# Patient Record
Sex: FEMALE | Race: WHITE | NOT HISPANIC OR LATINO | URBAN - METROPOLITAN AREA
[De-identification: names, ages, dates, MRNs, and addresses within clinical notes are randomized per-mention and may not be internally consistent; named-entity substitution may affect disease eponyms.]

---

## 2020-08-06 ENCOUNTER — OUTPATIENT (OUTPATIENT)
Dept: OUTPATIENT SERVICES | Facility: HOSPITAL | Age: 25
LOS: 1 days | End: 2020-08-06

## 2020-08-06 VITALS
HEART RATE: 61 BPM | DIASTOLIC BLOOD PRESSURE: 72 MMHG | RESPIRATION RATE: 16 BRPM | OXYGEN SATURATION: 98 % | HEIGHT: 63 IN | TEMPERATURE: 98 F | WEIGHT: 111.99 LBS | SYSTOLIC BLOOD PRESSURE: 98 MMHG

## 2020-08-06 DIAGNOSIS — Z98.890 OTHER SPECIFIED POSTPROCEDURAL STATES: Chronic | ICD-10-CM

## 2020-08-06 DIAGNOSIS — M26.619 ADHESIONS AND ANKYLOSIS OF TEMPOROMANDIBULAR JOINT, UNSPECIFIED SIDE: ICD-10-CM

## 2020-08-06 DIAGNOSIS — Z90.89 ACQUIRED ABSENCE OF OTHER ORGANS: Chronic | ICD-10-CM

## 2020-08-06 LAB
ANION GAP SERPL CALC-SCNC: 16 MMO/L — HIGH (ref 7–14)
BUN SERPL-MCNC: 15 MG/DL — SIGNIFICANT CHANGE UP (ref 7–23)
CALCIUM SERPL-MCNC: 9.5 MG/DL — SIGNIFICANT CHANGE UP (ref 8.4–10.5)
CHLORIDE SERPL-SCNC: 101 MMOL/L — SIGNIFICANT CHANGE UP (ref 98–107)
CO2 SERPL-SCNC: 22 MMOL/L — SIGNIFICANT CHANGE UP (ref 22–31)
CREAT SERPL-MCNC: 0.75 MG/DL — SIGNIFICANT CHANGE UP (ref 0.5–1.3)
GLUCOSE SERPL-MCNC: 91 MG/DL — SIGNIFICANT CHANGE UP (ref 70–99)
HCG UR-SCNC: NEGATIVE — SIGNIFICANT CHANGE UP
HCT VFR BLD CALC: 40.8 % — SIGNIFICANT CHANGE UP (ref 34.5–45)
HGB BLD-MCNC: 13.9 G/DL — SIGNIFICANT CHANGE UP (ref 11.5–15.5)
MCHC RBC-ENTMCNC: 29.6 PG — SIGNIFICANT CHANGE UP (ref 27–34)
MCHC RBC-ENTMCNC: 34.1 % — SIGNIFICANT CHANGE UP (ref 32–36)
MCV RBC AUTO: 87 FL — SIGNIFICANT CHANGE UP (ref 80–100)
NRBC # FLD: 0 K/UL — SIGNIFICANT CHANGE UP (ref 0–0)
PLATELET # BLD AUTO: 202 K/UL — SIGNIFICANT CHANGE UP (ref 150–400)
PMV BLD: 11.6 FL — SIGNIFICANT CHANGE UP (ref 7–13)
POTASSIUM SERPL-MCNC: 3.6 MMOL/L — SIGNIFICANT CHANGE UP (ref 3.5–5.3)
POTASSIUM SERPL-SCNC: 3.6 MMOL/L — SIGNIFICANT CHANGE UP (ref 3.5–5.3)
RBC # BLD: 4.69 M/UL — SIGNIFICANT CHANGE UP (ref 3.8–5.2)
RBC # FLD: 12.2 % — SIGNIFICANT CHANGE UP (ref 10.3–14.5)
SODIUM SERPL-SCNC: 139 MMOL/L — SIGNIFICANT CHANGE UP (ref 135–145)
SP GR UR: 1.02 — SIGNIFICANT CHANGE UP (ref 1–1.04)
WBC # BLD: 6.03 K/UL — SIGNIFICANT CHANGE UP (ref 3.8–10.5)
WBC # FLD AUTO: 6.03 K/UL — SIGNIFICANT CHANGE UP (ref 3.8–10.5)

## 2020-08-06 NOTE — H&P PST ADULT - NSANTHOSAYNRD_GEN_A_CORE
No. MT screening performed.  STOP BANG Legend: 0-2 = LOW Risk; 3-4 = INTERMEDIATE Risk; 5-8 = HIGH Risk

## 2020-08-06 NOTE — H&P PST ADULT - NEGATIVE ENMT SYMPTOMS
no dysphagia/no tinnitus/no vertigo/no sinus symptoms/no hearing difficulty/no ear pain/no throat pain

## 2020-08-06 NOTE — H&P PST ADULT - NSICDXFAMILYHX_GEN_ALL_CORE_FT
FAMILY HISTORY:  Mother  Still living? Unknown  FH: breast cancer, Age at diagnosis: Age Unknown    Grandparent  Still living? Unknown  FH: breast cancer, Age at diagnosis: Age Unknown    Aunt  Still living? Unknown  FH: breast cancer, Age at diagnosis: Age Unknown

## 2020-08-06 NOTE — H&P PST ADULT - NSICDXPROBLEM_GEN_ALL_CORE_FT
PROBLEM DIAGNOSES  Problem: Adhesions and ankylosis of temporomandibular joint  Assessment and Plan: Pt scheduled for surgery on 8/11/2020.  Pre-op instructions provided. Pt verbalized understanding.   Pepcid provided for GI prophylaxis. PROBLEM DIAGNOSES  Problem: Adhesions and ankylosis of temporomandibular joint  Assessment and Plan: Pt scheduled for surgery on 8/11/2020.  Pre-op instructions provided. Pt verbalized understanding.   Pepcid provided for GI prophylaxis.   Pt states she is already scheduled for preop COVID testing.

## 2020-08-06 NOTE — H&P PST ADULT - HISTORY OF PRESENT ILLNESS
25 year old female with c/o TMJ clicking and pain x past few years with worsening of symptoms over the past 2 months. Pt presents today for presurgical evaluation for ... 25 year old female with c/o TMJ clicking and pain x past few years with worsening of symptoms over the past 2 months. Pt presents today for presurgical evaluation for Bilateral Temporomandibular Joint Arthroscopy.

## 2020-08-06 NOTE — H&P PST ADULT - MALLAMPATI CLASS
pt unable to open mouth fully due to TMJ pain/Class III - visualization of the soft palate and the base of the uvula

## 2020-08-06 NOTE — H&P PST ADULT - DENTITION
Reached out to on call provider as this patient states it has been three days requesting a nicotine patch, and he stated he was willing to have a friend bring cigarettes and leave the floor AMA. 1608-Dr. Jacki Majano called to place phone order for 21 mg patch for the patient. Will place once verified by pharmacy. Pt denies any loose teeth or dentures. Permenant lower retainer

## 2020-08-07 DIAGNOSIS — Z01.818 ENCOUNTER FOR OTHER PREPROCEDURAL EXAMINATION: ICD-10-CM

## 2020-08-08 ENCOUNTER — APPOINTMENT (OUTPATIENT)
Dept: DISASTER EMERGENCY | Facility: CLINIC | Age: 25
End: 2020-08-08

## 2020-08-08 LAB — SARS-COV-2 N GENE NPH QL NAA+PROBE: NOT DETECTED

## 2020-08-10 ENCOUNTER — TRANSCRIPTION ENCOUNTER (OUTPATIENT)
Age: 25
End: 2020-08-10

## 2020-08-11 ENCOUNTER — OUTPATIENT (OUTPATIENT)
Dept: OUTPATIENT SERVICES | Facility: HOSPITAL | Age: 25
LOS: 1 days | Discharge: HOME | End: 2020-08-11

## 2020-08-11 VITALS
SYSTOLIC BLOOD PRESSURE: 113 MMHG | OXYGEN SATURATION: 100 % | HEART RATE: 68 BPM | WEIGHT: 111.99 LBS | DIASTOLIC BLOOD PRESSURE: 74 MMHG | TEMPERATURE: 99 F | RESPIRATION RATE: 16 BRPM | HEIGHT: 63 IN

## 2020-08-11 VITALS
SYSTOLIC BLOOD PRESSURE: 118 MMHG | TEMPERATURE: 99 F | OXYGEN SATURATION: 99 % | HEART RATE: 96 BPM | DIASTOLIC BLOOD PRESSURE: 73 MMHG | RESPIRATION RATE: 14 BRPM

## 2020-08-11 DIAGNOSIS — M26.619 ADHESIONS AND ANKYLOSIS OF TEMPOROMANDIBULAR JOINT, UNSPECIFIED SIDE: ICD-10-CM

## 2020-08-11 DIAGNOSIS — Z90.89 ACQUIRED ABSENCE OF OTHER ORGANS: Chronic | ICD-10-CM

## 2020-08-11 DIAGNOSIS — Z98.890 OTHER SPECIFIED POSTPROCEDURAL STATES: Chronic | ICD-10-CM

## 2020-08-11 LAB — HCG UR QL: NEGATIVE — SIGNIFICANT CHANGE UP

## 2020-08-11 RX ORDER — MESALAMINE 400 MG
4 TABLET, DELAYED RELEASE (ENTERIC COATED) ORAL
Qty: 0 | Refills: 0 | DISCHARGE

## 2020-08-11 RX ORDER — SERTRALINE 25 MG/1
1 TABLET, FILM COATED ORAL
Qty: 0 | Refills: 0 | DISCHARGE

## 2020-08-11 RX ORDER — FENTANYL CITRATE 50 UG/ML
40 INJECTION INTRAVENOUS
Refills: 0 | Status: DISCONTINUED | OUTPATIENT
Start: 2020-08-11 | End: 2020-08-11

## 2020-08-11 RX ORDER — DESOGESTREL AND ETHINYL ESTRADIOL 0.15-0.03
1 KIT ORAL
Qty: 0 | Refills: 0 | DISCHARGE

## 2020-08-11 RX ORDER — SERTRALINE 25 MG/1
50 TABLET, FILM COATED ORAL DAILY
Refills: 0 | Status: DISCONTINUED | OUTPATIENT
Start: 2020-08-11 | End: 2020-08-11

## 2020-08-11 RX ORDER — SODIUM CHLORIDE 9 MG/ML
3 INJECTION INTRAMUSCULAR; INTRAVENOUS; SUBCUTANEOUS EVERY 8 HOURS
Refills: 0 | Status: DISCONTINUED | OUTPATIENT
Start: 2020-08-11 | End: 2020-08-11

## 2020-08-11 RX ORDER — OXYCODONE HYDROCHLORIDE 5 MG/1
1 TABLET ORAL
Qty: 12 | Refills: 0
Start: 2020-08-11 | End: 2020-08-12

## 2020-08-11 RX ORDER — SODIUM CHLORIDE 9 MG/ML
1000 INJECTION, SOLUTION INTRAVENOUS
Refills: 0 | Status: DISCONTINUED | OUTPATIENT
Start: 2020-08-11 | End: 2020-08-11

## 2020-08-11 RX ORDER — FENTANYL CITRATE 50 UG/ML
20 INJECTION INTRAVENOUS
Refills: 0 | Status: DISCONTINUED | OUTPATIENT
Start: 2020-08-11 | End: 2020-08-11

## 2020-08-11 RX ORDER — MESALAMINE 400 MG
3 TABLET, DELAYED RELEASE (ENTERIC COATED) ORAL
Qty: 0 | Refills: 0 | DISCHARGE

## 2020-08-11 RX ADMIN — FENTANYL CITRATE 20 MICROGRAM(S): 50 INJECTION INTRAVENOUS at 10:35

## 2020-08-11 RX ADMIN — FENTANYL CITRATE 40 MICROGRAM(S): 50 INJECTION INTRAVENOUS at 09:25

## 2020-08-11 RX ADMIN — FENTANYL CITRATE 20 MICROGRAM(S): 50 INJECTION INTRAVENOUS at 10:50

## 2020-08-11 RX ADMIN — FENTANYL CITRATE 40 MICROGRAM(S): 50 INJECTION INTRAVENOUS at 10:00

## 2020-08-11 RX ADMIN — FENTANYL CITRATE 40 MICROGRAM(S): 50 INJECTION INTRAVENOUS at 09:40

## 2020-08-11 RX ADMIN — FENTANYL CITRATE 40 MICROGRAM(S): 50 INJECTION INTRAVENOUS at 10:30

## 2020-08-11 NOTE — ASU DISCHARGE PLAN (ADULT/PEDIATRIC) - ASU DC SPECIAL INSTRUCTIONSFT
You received iv tylenol at  900 am You must not take tylenol or any products containing tylenol until after 3 pm you received iv toradol you must not take any products containing ibuprofen until after 0300 pm today

## 2020-08-11 NOTE — H&P ADULT - NSHPSOCIALHISTORY_GEN_ALL_CORE
Social History:  · Marital Status	Single  · Occupation	 in social media  · Lives With	significant other     Substance Use History:  · Substance Use	caffeine  · Caffeine Type	coffee  · Caffeine Amount/Frequency	1-2 cups/cans per day     Alcohol Use History:  · Have you ever consumed alcohol	yes...  · Alcohol Type	wine  · Alcohol Frequency	2-4 times/mo  · Alcohol Amount	1-2 drinks  · Problems Related to Alcohol Use	no  · 1. Have you felt you ought to CUT down on your drinking?	no  · 2. Have people ANNOYED you by criticizing your drinking?	no  · 3. Have you ever felt bad or GUILTY about your drinking?	no  · 4. Have you ever needed an "EYE OPENER", a drink first thing in the morning to steady your nerves or get rid of a hangover?	no     Tobacco Usage:  · Tobacco Usage: Never smoker     Passive Smoke Exposure:  · Passive Smoke Exposure	No

## 2020-08-11 NOTE — H&P ADULT - NSHPREVIEWOFSYSTEMS_GEN_ALL_CORE
Review of Systems:   · Negative General Symptoms	no fever; no chills; no sweating  · Negative Skin Symptoms	no rash; no itching; no dryness; no change in size/color of mole  · Negative Breast Symptoms	no breast tenderness L; no breast tenderness R; no breast lump L; no breast lump R  · Negative Ophthalmologic Symptoms	no diplopia; no photophobia; no pain L; no pain R  · Negative ENMT Symptoms	no hearing difficulty; no ear pain; no tinnitus; no vertigo; no sinus symptoms; no throat pain; no dysphagia  · ENMT Comments	TMJ clicking and pain  · Negative Respiratory and Thorax Symptoms	no wheezing; no dyspnea; no cough  · Negative Cardiovascular Symptoms	no chest pain; no palpitations; no dyspnea on exertion; no peripheral edema; no claudication  · Negative Gastrointestinal Symptoms	no nausea; no vomiting  · Gastrointestinal Symptoms	diarrhea; constipation; abdominal pain; occasional with Crohn's flares  · Negative General Genitourinary Symptoms	no hematuria; no dysuria; normal urinary frequency  · Negative Female-Specific Symptoms	no irregular menses; no menorrhagia  · Negative Musculoskeletal Symptoms	no arthritis; no neck pain; no back pain  · Negative Neurological Symptoms	no weakness; no paresthesias; no generalized seizures; no focal seizures  · Negative Psychiatric Symptoms	no suicidal ideation  · Psychiatric Symptoms	depression; anxiety  · Negative Hematology Symptoms	no gum bleeding; no nose bleeding  · Negative Lymphatic Symptoms	no enlarged lymph nodes; no tender lymph nodes  · Negative Endocrine Symptoms	no cold intolerance; no heat intolerance  · Negative Allergy Types	no outdoor environmental allergies; no indoor environmental allergies

## 2020-08-11 NOTE — ASU DISCHARGE PLAN (ADULT/PEDIATRIC) - CARE PROVIDER_API CALL
Joshua Hidalgo  ORAL/MAXILLOFACIAL SURGERY  [address] [id number]  Phone: [phone]  Fax: [phone]  Follow Up Time:

## 2020-08-11 NOTE — ASU DISCHARGE PLAN (ADULT/PEDIATRIC) - CALL YOUR DOCTOR IF YOU HAVE ANY OF THE FOLLOWING:
Wound/Surgical Site with redness, or foul smelling discharge or pus/Bleeding that does not stop/Swelling that gets worse/Numbness, tingling, color or temperature change to extremity/Nausea and vomiting that does not stop/Pain not relieved by Medications

## 2020-08-11 NOTE — H&P ADULT - NSHPPHYSICALEXAM_GEN_ALL_CORE
Physical Exam:  · Constitutional	well-developed; well-groomed; well-nourished; no distress  · Eyes	PERRL; EOMI; conjunctiva clear  · ENMT	mouth  · Mouth	moist  · ENMT Comments	tenderness to left TMJ, pain with ROM bilateral TMJ  · Neck	supple  · Breasts	not examined  · Back	normal shape; ROM intact; strength intact  · Respiratory	airway patent; breath sounds equal; good air movement; respirations non-labored; clear to auscultation bilaterally  · Cardiovascular	regular rate and rhythm  no murmur  · Gastrointestinal	soft; nontender; no distention; no masses palpable; bowel sounds normal  · Genitourinary	not examined  · Rectal	not examined  · Extremities	detailed exam  · Extremities Details	no clubbing; no cyanosis; no pedal edema  · Vascular	detailed exam  · DP Pulse	right normal; left normal  · Neurological	detailed exam  · Neurological Details	alert and oriented x 3; sensation intact; normal strength  · Skin	detailed exam  · Skin Details	warm and dry; color normal  · Lymph Nodes	detailed exam  · Lymphatics Comments	No cervical/supraclavicular lymphadenopathy palpated on exam.  · Musculoskeletal	detailed exam  · Musculoskeletal Details	ROM intact; no joint swelling; no joint erythema; no joint warmth; no calf tenderness; normal strength  · Psychiatric	detailed exam  · Psychiatric Details	normal affect; normal behavior

## 2020-08-11 NOTE — H&P ADULT - HISTORY OF PRESENT ILLNESS
25 year old female with c/o TMJ clicking and pain x past few years with worsening of symptoms over the past 2 months, scheduled for TMJ arthroscopy with Dr. Hidalgo on 08/11/2020.

## 2020-11-23 NOTE — H&P PST ADULT - AS BP NONINV METHOD
auscultated w/ stethoscope Drysol Counseling:  I discussed with the patient the risks of drysol/aluminum chloride including but not limited to skin rash, itching, irritation, burning.

## 2021-06-27 ENCOUNTER — TRANSCRIPTION ENCOUNTER (OUTPATIENT)
Age: 26
End: 2021-06-27

## 2021-11-12 NOTE — ASU PATIENT PROFILE, ADULT - PATIENT REPRESENTATIVE PHONE
Rosa Martinez is a 84 year old female here for  Chief Complaint   Patient presents with   • Office Visit     Denies latex allergy or sensitivity.    Medication verified, no changes.  PCP and Pharmacy verified.    Social History     Tobacco Use   Smoking Status Never Smoker   Smokeless Tobacco Never Used     Advance Directives Filed: Yes    ECOG:   ECOG [11/12/21 1100]   ECOG Performance Status 3       Vitals:    Visit Vitals  BP (!) 150/67 (BP Location: LUE - Left upper extremity, Patient Position: Sitting, Cuff Size: Regular)   Pulse 89   Temp 97.7 °F (36.5 °C) (Oral)   Resp 20   Wt 89.4 kg (197 lb)   SpO2 97%   BMI 36.03 kg/m²       These vital signs are:  Within defined parameters (Per Reference \"Defined Limits Hospital Outpatient Department ([facility])\")    Height: No.  Ht Readings from Last 1 Encounters:   11/03/21 5' 2\" (1.575 m)     Weight:Yes, shoes on.  Wt Readings from Last 3 Encounters:   11/12/21 89.4 kg (197 lb)   11/03/21 89.2 kg (196 lb 11.2 oz)   10/18/21 88.9 kg (196 lb)       BMI: Body mass index is 36.03 kg/m².    REVIEW OF SYSTEMS  GENERAL:  Patient denies headache, fevers, chills, night sweats, excessive fatigue, change in appetite, weight loss, dizziness  ALLERGIC/IMMUNOLOGIC: Verified allergies: Yes  EYES:  Patient denies significant visual difficulties, double vision, blurred vision  ENT/MOUTH: Patient denies sore throat, sinus drainage, mouth sores, but complains of: problems with hearing  ENDOCRINE:  Patient denies diabetes, hormone replacement, hot flashes, but complains of: thyroid disease  HEMATOLOGIC/LYMPHATIC: Patient denies easy bruising, bleeding, tender lymph nodes, swollen lymph nodes  BREASTS: Patient denies abnormal masses of breast, nipple discharge, pain  RESPIRATORY:  Patient denies lung pain with breathing, cough, coughing up blood, shortness of breath  CARDIOVASCULAR:  Patient denies anginal chest pain, palpitations, shortness of breath when lying flat, peripheral  edema  GASTROINTESTINAL: Patient denies abdominal pain , nausea, vomiting, diarrhea, GI bleeding, change in bowel habits, heartburn, sensation of feeling full, difficulty swallowing, but complains of: constipation  : Patient denies abnormal genital masses, blood in the urine, frequency, urgency, burning with urination, hesitancy, incontinence, vaginal bleeding, discharge  MUSCULOSKELETAL:  Patient denies joint pain, bone pain, joint swelling, redness, decreased range of motion  SKIN:  Patient denies chronic rashes, inflammation, ulcerations, skin changes, itching  NEUROLOGIC:  Patient denies loss of balance, areas of focal weakness, abnormal gait, sensory problems, numbness, tingling  PSYCHIATRIC: Patient denies insomnia, depression, anxiety    This patient reported abnormal symptoms that needed immediate verbal communication: No   [phone]

## 2025-02-14 NOTE — H&P PST ADULT - NSALCOHOLPROBLEMSRELYN_GEN_A_CORE_SD
ASSESSMENT & PLAN:   Diagnoses and all orders for this visit:    Women's annual routine gynecological examination    Encounter for screening mammogram for malignant neoplasm of breast  -     Mammo screening bilateral w 3d and cad; Future    Screening for colon cancer  -     Cologuard    Encounter for surveillance of contraceptive pills  -     desogestrel-ethinyl estradiol (Volnea) 0.15-0.02/0.01 MG () per tablet; Take 1 tablet by mouth daily          The following were reviewed in today's visit: ASCCP guidelines, Gardisil vaccination, STD testing breast self exam, mammography screening ordered, use and side effects of OCPs, menopause, exercise, and healthy diet.    Patient to return to office in yearly for annual exam.     All questions have been answered to her satisfaction.        CC:  Annual Gynecologic Examination  Chief Complaint   Patient presents with   • Gynecologic Exam     Patient presents for her yearly exam.  Pap 10/16/23 NILM/ Neg HPV   Pap 2020 NEG pap/ Neg HPV   mammo 24 Negative   cologuard 3/31/22 - negative - repeat 3 years         HPI: Mee Cm is a 51 y.o.  who presents for annual gynecologic examination.  She has the following concerns:  none, has tried stopping OCP every 3-4 months but sill has a period and migraines during her bleeding - would like to continue OCP for now      Health Maintenance:    Exercise: intermittently  Breast exams/breast awareness: yes  Last mammogram:   Colorectal cancer screenin    Past Medical History:   Diagnosis Date   • Allergic     seasonal, food allergies   • Asthma    • GERD (gastroesophageal reflux disease)    • Hypertension    • Irritable bowel syndrome    • Migraine without aura    • Shoulder subluxation, left    • Tennis elbow        Past Surgical History:   Procedure Laterality Date   • TONSILLECTOMY     • US GUIDED MSK PROCEDURE  2020   • WRIST SURGERY         Past OB/Gyn History:   Patient's last menstrual period  was 10/01/2024 (approximate).    Menopausal status: perimenopausal  Menopausal symptoms: None    Last Pap: 2023 : no abnormalities  History of abnormal Pap smear: no    Patient is not currently sexually active.   STD testing: no  Current contraception: cOCP (estrogen/progesterone)      Family History  Family History   Problem Relation Age of Onset   • Stroke Mother         CEREBROVASCULAR ACCIDENT (CVA), LISTED 2X   • Hypertension Mother    • Hyperlipidemia Mother    • Hypertension Father    • Hyperlipidemia Father    • Skin cancer Father    • Melanoma Father 75   • Cervical cancer Neg Hx    • Colon cancer Neg Hx    • Ovarian cancer Neg Hx    • Uterine cancer Neg Hx        Family history of uterine or ovarian cancer: no  Family history of breast cancer: no  Family history of colon cancer: no    Social History:  Social History     Socioeconomic History   • Marital status: Single     Spouse name: Not on file   • Number of children: Not on file   • Years of education: Not on file   • Highest education level: Not on file   Occupational History   • Not on file   Tobacco Use   • Smoking status: Never   • Smokeless tobacco: Never   Vaping Use   • Vaping status: Never Used   Substance and Sexual Activity   • Alcohol use: Yes     Comment: socially    • Drug use: No   • Sexual activity: Not Currently     Partners: Male     Comment: Birth Control Pills   Other Topics Concern   • Not on file   Social History Narrative    CAFFEINE USE - 16oz/daily     EXERCISE HABITS    Adopted 3 children: girls ages 1, 6, and 12yo - all sisters     Is a  at [employer]      Social Drivers of Health     Financial Resource Strain: Not on file   Food Insecurity: Not on file   Transportation Needs: Not on file   Physical Activity: Not on file   Stress: Not on file   Social Connections: Not on file   Intimate Partner Violence: Not on file   Housing Stability: Not on file     Domestic violence screen:  negative    Allergies:  Allergies   Allergen Reactions   • Cat Dander    • Erythromycin    • Hctz [Hydrochlorothiazide] Dizziness   • Peanut [Nuts - Food Allergy]    • Seasonal Ic  [Cholestatin]    • Trish Sun Screen Spf 30 [Albolene] Rash   • Latex Rash   • Medical Tape Rash       Medications:    Current Outpatient Medications:   •  albuterol (PROVENTIL HFA,VENTOLIN HFA) 90 mcg/act inhaler, Inhale, Disp: , Rfl:   •  Ascorbic Acid (VITAMIN C) 500 MG CAPS, Take by mouth, Disp: , Rfl:   •  Calcium 250 MG CAPS, Take by mouth, Disp: , Rfl:   •  desogestrel-ethinyl estradiol (Volnea) 0.15-0.02/0.01 MG (21/5) per tablet, Take 1 tablet by mouth daily, Disp: 112 tablet, Rfl: 3  •  EPINEPHrine (EPIPEN) 0.3 mg/0.3 mL SOAJ, as directed, Disp: , Rfl:   •  fexofenadine (ALLEGRA) 180 MG tablet, Take by mouth, Disp: , Rfl:   •  lisinopril (ZESTRIL) 5 mg tablet, Take 1 tablet (5 mg total) by mouth daily, Disp: 90 tablet, Rfl: 0  •  Multiple Vitamin (MULTI-VITAMIN DAILY) TABS, Take by mouth, Disp: , Rfl:   •  Omega-3 Fatty Acids (Fish Oil) 1000 MG CPDR, Take by mouth 2 (two) times a day, Disp: 180 capsule, Rfl: 0  •  predniSONE 10 mg tablet, Take 60mg QD x2days, take 50mg QD x2days, take 40mg QD x2days, take 30mg QD x2days, take 20mg QD x2days, take 10mg QD x2days, take 5mg QD x2days, Disp: 43 tablet, Rfl: 0  •  sertraline (ZOLOFT) 25 mg tablet, Take 1 tablet (25 mg total) by mouth daily, Disp: , Rfl:   •  Sodium Fluoride 5000 PPM 1.1 % PSTE, PLEASE SEE ATTACHED FOR DETAILED DIRECTIONS, Disp: , Rfl:   •  triamcinolone (KENALOG) 0.5 % ointment, Apply topically 2 (two) times a day, Disp: 15 g, Rfl: 0    Review of Systems:  Review of Systems   Constitutional: Negative.    HENT: Negative.     Respiratory: Negative.     Cardiovascular: Negative.    Gastrointestinal: Negative.    Genitourinary: Negative.    Skin: Negative.    Neurological: Negative.          Physical Exam:  /82 (BP Location: Right arm, Patient Position: Sitting,  "Cuff Size: Standard)   Ht 5' 3.5\" (1.613 m)   Wt 65 kg (143 lb 6.4 oz)   LMP 10/01/2024 (Approximate)   Breastfeeding No   BMI 25.00 kg/m²    Physical Exam  Constitutional:       Appearance: Normal appearance.   Genitourinary:      Bladder and urethral meatus normal.      No lesions in the vagina.      Right Labia: No rash, tenderness, lesions, skin changes or Bartholin's cyst.     Left Labia: No tenderness, lesions, skin changes, Bartholin's cyst or rash.     No vaginal erythema, tenderness or bleeding.        Right Adnexa: not tender, not full and no mass present.     Left Adnexa: not tender, not full and no mass present.     Cervix is nulliparous.      No cervical motion tenderness, discharge, lesion or polyp.      Uterus is not enlarged, fixed or tender.      No uterine mass detected.     Urethral meatus caruncle not present.     No urethral tenderness or mass present.   Breasts:     Right: No swelling, bleeding, inverted nipple, mass, nipple discharge, skin change or tenderness.      Left: No swelling, bleeding, inverted nipple, mass, nipple discharge, skin change or tenderness.   HENT:      Head: Normocephalic and atraumatic.   Eyes:      Extraocular Movements: Extraocular movements intact.      Conjunctiva/sclera: Conjunctivae normal.      Pupils: Pupils are equal, round, and reactive to light.   Cardiovascular:      Rate and Rhythm: Normal rate and regular rhythm.      Heart sounds: Normal heart sounds. No murmur heard.  Pulmonary:      Effort: Pulmonary effort is normal. No respiratory distress.      Breath sounds: Normal breath sounds. No wheezing or rales.   Abdominal:      General: There is no distension.      Palpations: Abdomen is soft.      Tenderness: There is no abdominal tenderness. There is no guarding.   Neurological:      General: No focal deficit present.      Mental Status: She is alert and oriented to person, place, and time.   Skin:     General: Skin is warm and dry.   Psychiatric:    "      Mood and Affect: Mood normal.         Behavior: Behavior normal.   Vitals and nursing note reviewed.                            no